# Patient Record
(demographics unavailable — no encounter records)

---

## 2024-11-26 NOTE — HISTORY OF PRESENT ILLNESS
[FreeTextEntry1] : BRI BARNHART is a 57 year old male with PMH of HTN, transverse myelitis, T11-12 lesion, daily marijuana use, former smoker (quit 2 weeks ago), presenting today for evaluation of rectal bleeding. He reports a 2-3 week history of daily rectal bleeding. Blood is on the stool and on the toilet paper. He denies rectal pain. No straining or constipation. He denies any family history of colon cancer or polyps. He moves his bowels daily. He has not had any prior episodes of rectal bleeding that he can recall. He has never had a colonoscopy before.  He was recently hospitalized at [facility] for LE pain, and numbness and was found to have a lesion on the T 11-12 vertebrae. This has also caused him to loose feeling in the rectum. He is currently following with neurology.   He also reports recent bouts of heartburn, especially after starting steroids while hospitalized. He was sent home on Pantoprazole and reports improvement. He has had bouts of esophageal dysphagia for the last few years that occur randomly. He will feel food getting stuck in the epigastric area at times. He denies choking, or regurgitation of the food bolus. No nausea, vomiting, weight loss. He has never had an EGD.   Labs from hospital stay were reviewed and are WNL

## 2024-11-26 NOTE — REASON FOR VISIT
[Initial Evaluation] : an initial evaluation [Spouse] : spouse [FreeTextEntry1] : rectal bleeding, heartburn, dysphagia

## 2024-11-26 NOTE — ASSESSMENT
[FreeTextEntry1] : 57 year old male with rectal bleeding , heartburn, and dysphagia presenting for evaluation. He will undergo both an upper endoscopy and colonoscopy for further evaluation. I have discussed the indications, benefits, risks (including but not limited to reaction to the anesthesia, infection, bleeding, missed lesions, and perforation), and alternatives to an EGD and colonoscopy with the patient. The patient understands all options and has agreed to have both procedures. He is medically optimized.   Rectal bleeding is likely hemorrhoidal. High fiber diet and adequate hydration discussed Gavilyte prep Continue Pantoprazole daily

## 2024-12-10 NOTE — HISTORY OF PRESENT ILLNESS
[FreeTextEntry1] : Posthospital visit December 10, 2024: This is a 57-year-old man who presents today after being seen last month in the hospital with transverse myelitis.  He had a enhancing expansile lesion at T11-12.  He received a steroid pulse.  He had an MRI of his brain which did not show any acute pathology.  He had MOG antibodies which were negative he had NMO antibodies which were negative.  He had a cervical spine MRI recently which did not show any cord signal abnormalities.  He is here today for neurologic follow-up noting that his walking is slow slightly improved from where he was about a month ago.  He also has occasional foot drop weakness at his feet as the day progresses.

## 2024-12-10 NOTE — PHYSICAL EXAM

## 2024-12-10 NOTE — CONSULT LETTER
[Dear  ___] : Dear  [unfilled], [Consult Letter:] : I had the pleasure of evaluating your patient, [unfilled]. [Please see my note below.] : Please see my note below. [Consult Closing:] : Thank you very much for allowing me to participate in the care of this patient.  If you have any questions, please do not hesitate to contact me. [Sincerely,] : Sincerely, [FreeTextEntry3] : Polo Celestin M.D., Ph.D. DPN-N [facility] [place] Director, Division of Neurology Director, Comprehensive Stroke Center [facility]

## 2024-12-10 NOTE — ASSESSMENT
[FreeTextEntry1] : This is a 57-year-old man with transverse myelitis in the lower thoracic spinal cord on MRI from about a month ago.  He was seen at the hospital following the MRI and given a pulse dose of steroids.  He is slowly recovering.  I would anticipate a good recovery.  MRI of the brain and MRI of the cervical spine did not show further demyelinating lesions to warrant a diagnosis of multiple sclerosis.  NMO and anti-Mogg antibodies were negative this does not appear to be neuromyelitis optica syndrome disorder.  He we will continue physical therapy that he just recently started.  I will increase his gabapentin to help with pain help titrated up from 300 mg 3 times a day to 600 mg 3 times a day over the course of a month as needed.  I will see him back in 3 months but obtain a follow-up MRI of his thoracic spine with and without contrast in 2 months and call him with those results.

## 2024-12-10 NOTE — REASON FOR VISIT
[Post Hospitalization] : a post hospitalization visit [Other: _____] : [unfilled] [FreeTextEntry1] : Transverse myelitis

## 2024-12-12 NOTE — SOCIAL HISTORY
[TextEntry] : Patient quit 2-3 weeks ago, currently on nicotine patch EtOH use denies  Smoking marijuana

## 2024-12-12 NOTE — PLAN
[TextEntry] : Will check secondary HTN workup  Check renal us with renal artery duplex  Continue current medications, will switch hydralazine to diuretic next visit after workup is complete  Discussed with the patient and his wife  Follow up in 1 month with results prior

## 2024-12-12 NOTE — REVIEW OF SYSTEMS
[TextEntry] :    General: Denies unintentional weight loss Ears/Nose/Throat: Denies sinusitis Cardiovascular: Denies chest pain, dyspnea, no peripheral edema Respiratory: Denies cough, hemoptysis Gastrointestinal: Denies nausea, vomiting, diarrhea Genitourinary:: Denies hematuria, dysuria Musculoskeletal: Denies joint pain,myalgias Skin: Denies rash,pruritus Neurologic: Denies headaches, blurry vision Endocrine: Denies polydipsia, no polyuria

## 2024-12-12 NOTE — RESULTS/DATA
[TextEntry] : Labs  11/10/2024  BUN/Cr 31.5/0.99 Ca 8.3 eGFR 89 K 4.1  Utox positive for THC, barbiturates

## 2024-12-12 NOTE — HISTORY OF PRESENT ILLNESS
[FreeTextEntry1] : 57 year old male who was recently diagnosed with transverse myelitis, at [facility], was referred for evaluation of uncontrolled HTN. He was initially diagnosed with transverse myelytis and recieved pulse dose of steroids, and was discharged on oral prednisone. His BP started going up with headache, and he was readmitted to [facility] for hypertensive emergency. His BP got controlled on amlodipine 10 mg daily, lisinopril 40 mg daily, and hydralazine 50 mg tid. He is currently off steroids. Patient denies having any history of hypertension before.  No NSAID use.

## 2024-12-12 NOTE — ASSESSMENT
[FreeTextEntry1] : HTN, currently controlled with lisinopril 40 mg daily, and amlodipine 10 mg daily, and hydralazine 50 mg tid  He does not meet criteria for malignant hypertension, however, given sudden onset of hypertension will order other tests to rule out secondary hypertension causes   Transverse myelitis, Neurology is following  no abrasions, no jaundice, no lesions, no pruritis, and no rashes.

## 2024-12-12 NOTE — ASSESSMENT
[FreeTextEntry1] : HTN, currently controlled with lisinopril 40 mg daily, and amlodipine 10 mg daily, and hydralazine 50 mg tid  He does not meet criteria for malignant hypertension, however, given sudden onset of hypertension will order other tests to rule out secondary hypertension causes   Transverse myelitis, Neurology is following

## 2024-12-12 NOTE — PHYSICAL EXAM
[TextEntry] :  GA NAD HEENT normal Skin color normal CV S1S2 normal no peripheral edema Respiratory breath sounds normal GI abdomen soft, non-tender MSK no deformities Neuro A and Ox3

## 2024-12-12 NOTE — HISTORY OF PRESENT ILLNESS
[FreeTextEntry1] : 57 year old male who was recently diagnosed with transverse myelitis, at [facility], was referred for evaluation of uncontrolled HTN. He was initially diagnosed with transverse myelytis and recieved pulse dose of steroids, and was discharged on oral prednisone. His BP started going up with headache, and he was readmitted to [facility] for hypertensive emergency. His BP got controlled on amlodipine 10 mg daily, lisinopril 40 mg daily, and hydralazine 50 mg tid. He is currently off steroids. Patient denies having any history of hypertension before.  No NSAID use.

## 2024-12-23 NOTE — PLAN
Physical Therapy Visit    Visit Type: Daily Treatment Note  Visit: 2  Referring Provider: Mei White*  Medical Diagnosis (from order): M25.551 - Pain of right hip  S39.012A - Strain of lumbar region, initial encounter     SUBJECTIVE                                                                                                               Patient states the back has been feeling better. Has been performing the exercises.       OBJECTIVE                                                                                                                         Palpation  Right  - Quadratus Lumborum: hypertonic  - Gluteus Medius: hypertonic  - Piriformis: hypertonic                   Treatment     Therapeutic Exercise  Precautions: osteoporosis, history of left patellar fracture with surgery (still has some left knee pain)    Reviewed/performed home program:   Supine piriformis figure four stretch; 2 x 30 seconds    Manual Therapy   Soft tissue mobilization to the right low back and gluteal musculature in left sidelying    Neuromuscular Re-Education  Reviewed/performed home program:   Supine transverse abdominis muscle activation with 3 second holds x 10  Bent knee fallouts with transverse abdominis muscle activation x 10  Hooklying hip adduction with ball squeeze with transverse abdominis muscle activation x 10    New:  Supine alternating marching with transverse abdominis muscle activation x 10 each  Clam shells (no band) x 10 each  Standing hip abduction with no band x 10  (bilaterally)  Standing hip extension with non band x 10 (bilaterally)  Standing hip flexion with no band x 10 (bilaterally)  Side stepping along the stair railing x 4 laps    New:  Mini squats-attempted but deferred due to left knee pain    Skilled input: verbal instruction/cues, inhibition, tactile instruction/cues, demonstration, posture correction and facilitation    Writer verbally educated and received verbal consent for hand  placement, positioning of patient, and techniques to be performed today from patient for clothing adjustments for techniques, hand placement and palpation for techniques and therapist position for techniques as described above and how they are pertinent to the patient's plan of care.  Home Exercise Program  Access Code: [email or web address]  URL: [email or web address]  Date: 12/11/2024  Prepared by: Ellen Hare    Exercises  - Supine Figure 4 Piriformis Stretch  - 2 x daily - 7 x weekly - 2 sets - 30 hold  - Supine Transversus Abdominis Bracing - Hands on Ground  - 2 x daily - 7 x weekly - 1 sets - 10 reps - 3 hold  - Bent Knee Fallouts  - 2 x daily - 7 x weekly - 1 sets - 10 reps  - Supine Hip Adduction Isometric with Ball  - 2 x daily - 7 x weekly - 1 sets - 10 reps - 3 hold      ASSESSMENT                                                                                                            Patient able to perform basic transverse abdominis muscle activation with good form and no increased pain. No tenderness reported during soft tissue mobilization but there was decreased tissue resistance following manual therapy. Patient with some discomfort in the left knee with mini squats and thus this exercise was deferred.  Education:   - Results of above outlined education: Verbalizes understanding and Demonstrates understanding    PLAN                                                                                                                           Suggestions for next session as indicated: Progress per plan of care       Therapy procedure time and total treatment time can be found documented on the Time Entry flowsheet     [TextEntry] : Will check secondary HTN workup  Check renal us with renal artery duplex  Continue current medications, will switch hydralazine to diuretic next visit after workup is complete  Discussed with the patient and his wife  Follow up in 1 month with results prior

## 2025-01-22 NOTE — HISTORY OF PRESENT ILLNESS
[FreeTextEntry1] : 57 year old male who was recently diagnosed with transverse myelitis, at [facility], was referred for evaluation of uncontrolled HTN. He was initially diagnosed with transverse myelytis and recieved pulse dose of steroids, and was discharged on oral prednisone. His BP started going up with headache, and he was readmitted to [facility] for hypertensive emergency. His BP got controlled on amlodipine 10 mg daily, lisinopril 40 mg daily, and hydralazine 50 mg tid. He is currently off steroids. Patient denies having any history of hypertension before.  No NSAID use.  1/15/2025 Seen today  Denies any complaints  His BP is stable  Reviewed secondary HTN workup

## 2025-01-22 NOTE — PLAN
[TextEntry] : Secondary HTN workup negative  Renal duplex is normal  Continue current medications  Follow up in 6 months  Discussed with the veronika

## 2025-01-22 NOTE — HISTORY OF PRESENT ILLNESS
[FreeTextEntry1] : 57 year old male who was recently diagnosed with transverse myelitis, at [facility], was referred for evaluation of uncontrolled HTN. He was initially diagnosed with transverse myelytis and recieved pulse dose of steroids, and was discharged on oral prednisone. His BP started going up with headache, and he was readmitted to [facility] for hypertensive emergency. His BP got controlled on amlodipine 10 mg daily, lisinopril 40 mg daily, and hydralazine 50 mg tid. He is currently off steroids. Patient denies having any history of hypertension before.  No NSAID use.  1/15/2025 Seen today  Denies any complaints  His BP is stable  Reviewed secondary HTN workup

## 2025-01-22 NOTE — HISTORY OF PRESENT ILLNESS
[FreeTextEntry1] : 57 year old male who was recently diagnosed with transverse myelitis, at [facility], was referred for evaluation of uncontrolled HTN. He was initially diagnosed with transverse myelytis and recieved pulse dose of steroids, and was discharged on oral prednisone. His BP started going up with headache, and he was readmitted to [facility] for hypertensive emergency. His BP got controlled on amlodipine 10 mg daily, lisinopril 40 mg daily, and hydralazine 50 mg tid. He is currently off steroids. Patient denies having any history of hypertension before.  No NSAID use.  1/15/2025 Seen today  Denies any complaints  His BP is stable  Reviewed secondary HTN workup

## 2025-02-12 NOTE — PHYSICAL EXAM

## 2025-03-19 NOTE — ASSESSMENT
[FreeTextEntry1] : This is a 57-year-old man with history of transverse myelitis about 4 months ago.  He is a slowly recovering.  He is asking about coming off the gabapentin.  I gave him a tapering plan and told him that if at any point the numbness becomes more painful that he can just increase back to the previously tapered dose.  I will see him back for continued care of transverse myelitis in 1 year, sooner should the need arise.  I asked him to call me in the interim with any problems, questions or concerns.

## 2025-03-19 NOTE — HISTORY OF PRESENT ILLNESS
[FreeTextEntry1] : Posthospital visit December 10, 2024: This is a 57-year-old man who presents today after being seen last month in the hospital with transverse myelitis.  He had a enhancing expansile lesion at T11-12.  He received a steroid pulse.  He had an MRI of his brain which did not show any acute pathology.  He had MOG antibodies which were negative he had NMO antibodies which were negative.  He had a cervical spine MRI recently which did not show any cord signal abnormalities.  He is here today for neurologic follow-up noting that his walking is slow slightly improved from where he was about a month ago.  He also has occasional foot drop weakness at his feet as the day progresses.  Follow-up March 19, 2025: This is a 57-year-old man who presents with a history of transverse myelitis diagnosed in the hospital in November 2024.  He recently had a follow-up MRI which showed typical progression and healing of the lesion at T11/12.  There is no longer any enhancement seen.  He continues to have some mild numbness but no pain into his feet.  It is now only in his feet not extending into the leg at all.  There is no bowel or bladder involvement.  He is here today for neurologic follow-up.

## 2025-03-19 NOTE — CONSULT LETTER
[Dear  ___] : Dear  [unfilled], [Courtesy Letter:] : I had the pleasure of seeing your patient, [unfilled], in my office today. [Please see my note below.] : Please see my note below. [Consult Closing:] : Thank you very much for allowing me to participate in the care of this patient.  If you have any questions, please do not hesitate to contact me. [Sincerely,] : Sincerely, [FreeTextEntry3] : Polo Celestin M.D., Ph.D. DPN-N [facility] [place] Director, Division of Neurology Director, Comprehensive Stroke Center [facility]

## 2025-03-19 NOTE — PHYSICAL EXAM

## 2025-04-27 NOTE — HISTORY OF PRESENT ILLNESS
[FreeTextEntry1] : 57 year old male who was recently diagnosed with transverse myelitis, at [facility], was referred for evaluation of uncontrolled HTN. He was initially diagnosed with transverse myelytis and recieved pulse dose of steroids, and was discharged on oral prednisone. His BP started going up with headache, and he was readmitted to [facility] for hypertensive emergency. His BP got controlled on amlodipine 10 mg daily, lisinopril 40 mg daily, and hydralazine 50 mg tid. He is currently off steroids. Patient denies having any history of hypertension before.  No NSAID use.  4/24/25 Seen today  Denies any complaints  BP is uncontrolled  Did not take some of his BP medications

## 2025-04-27 NOTE — HISTORY OF PRESENT ILLNESS
[FreeTextEntry1] : 57 year old male who was recently diagnosed with transverse myelitis, at [facility], was referred for evaluation of uncontrolled HTN. He was initially diagnosed with transverse myelytis and recieved pulse dose of steroids, and was discharged on oral prednisone. His BP started going up with headache, and he was readmitted to [facility] for hypertensive emergency. His BP got controlled on amlodipine 10 mg daily, lisinopril 40 mg daily, and hydralazine 50 mg tid. He is currently off steroids. Patient denies having any history of hypertension before.  No NSAID use.  4/24/25 Seen today  Denies any complaints  BP is uncontrolled  Did not take some of his BP medications

## 2025-04-27 NOTE — PLAN
[TextEntry] : Secondary HTN workup negative  Renal duplex is normal  Start HCTZ 12.5 mg daily  Follow up in 6 months  Discussed with the patient

## 2025-04-27 NOTE — ASSESSMENT
[FreeTextEntry1] : HTN, currently uncontrolled with lisinopril 40 mg daily, and amlodipine 10 mg daily   Transverse myelitis, Neurology is following

## 2025-04-27 NOTE — RESULTS/DATA
[TextEntry] : Labs  4/5/25 BUN/Cr 18/0.96 K 4.4 HCO3 23 Ca 9.0 eGFR 92  Labs 1/11/2025 BUN/Cr 18/1.0 glucose 108 eGFR 88 HCO3 23 Renin activity plasma 21  aldosterone 1.5 Cortisol free 24 hrs 18 (normal) Normetanephrine 97.4 metanephrine<25  Labs  11/10/2024  BUN/Cr 31.5/0.99 Ca 8.3 eGFR 89 K 4.1  Utox positive for THC, barbiturates  Imaging  Us renal and bladder from 1/14/2025  Rt kdiney 10.6 cmx5.6 cmx4.4 cm there is no hydronephrosis solid renal mass of calculus Lt kdiney 10.9x4.9x5.1 cm   Renal duplex 1/14/25 Normal